# Patient Record
Sex: MALE | Race: WHITE | URBAN - METROPOLITAN AREA
[De-identification: names, ages, dates, MRNs, and addresses within clinical notes are randomized per-mention and may not be internally consistent; named-entity substitution may affect disease eponyms.]

---

## 2018-02-21 ENCOUNTER — OFFICE VISIT (OUTPATIENT)
Dept: DERMATOLOGY | Facility: CLINIC | Age: 53
End: 2018-02-21
Payer: COMMERCIAL

## 2018-02-21 DIAGNOSIS — I78.1 TELANGIECTASIA: Primary | ICD-10-CM

## 2018-02-21 DIAGNOSIS — L24.5 IRRITANT CONTACT DERMATITIS DUE TO OTHER CHEMICAL PRODUCTS: ICD-10-CM

## 2018-02-21 DIAGNOSIS — D22.9 MULTIPLE BENIGN NEVI: ICD-10-CM

## 2018-02-21 RX ORDER — ATORVASTATIN CALCIUM 80 MG/1
1 TABLET, FILM COATED ORAL
COMMUNITY
Start: 2017-05-22

## 2018-02-21 RX ORDER — METOPROLOL SUCCINATE 50 MG/1
TABLET, EXTENDED RELEASE ORAL
COMMUNITY
Start: 2017-12-07

## 2018-02-21 RX ORDER — DIPHENOXYLATE HYDROCHLORIDE AND ATROPINE SULFATE 2.5; .025 MG/1; MG/1
1 TABLET ORAL
COMMUNITY
Start: 2014-03-21

## 2018-02-21 RX ORDER — ASPIRIN 81 MG/1
1 TABLET ORAL
COMMUNITY
Start: 2014-02-21

## 2018-02-21 RX ORDER — TRIAMCINOLONE ACETONIDE 1 MG/G
CREAM TOPICAL
COMMUNITY
Start: 2017-10-03

## 2018-02-21 ASSESSMENT — PAIN SCALES - GENERAL: PAINLEVEL: NO PAIN (0)

## 2018-02-21 NOTE — MR AVS SNAPSHOT
After Visit Summary   2/21/2018    Black Patel    MRN: [id number]           Patient Information     Date Of Birth          1965        Visit Information        Provider Department      2/21/2018 4:00 PM JAY Carrillo MD [facility] Dermatology        Today's Diagnoses     Telangiectasia    -  1    Multiple benign nevi        Irritant contact dermatitis due to other chemical products           Follow-ups after your visit        Who to contact     Please call your clinic at [phone] to:    Ask questions about your health    Make or cancel appointments    Discuss your medicines    Learn about your test results    Speak to your doctor            Additional Information About Your Visit        MyChart Information     [employer] gives you secure access to your electronic health record. If you see a primary care provider, you can also send messages to your care team and make appointments. If you have questions, please call your primary care clinic.  If you do not have a primary care provider, please call [phone] and they will assist you.      [employer] is an electronic gateway that provides easy, online access to your medical records. With [employer], you can request a clinic appointment, read your test results, renew a prescription or communicate with your care team.     To access your existing account, please contact your [facility] or call [phone] for assistance.        Care EveryWhere ID     This is your Care EveryWhere ID. This could be used by other organizations to access your [facility] medical records  [id number]         Blood Pressure from Last 3 Encounters:   No data found for BP    Weight from Last 3 Encounters:   No data found for Wt              Today, you had the following     No orders found for display         Today's Medication Changes          These changes are accurate as of 2/21/18 11:59 PM.  If you have any questions, ask your nurse or  doctor.               These medicines have changed or have updated prescriptions.        Dose/Directions    aspirin EC 81 MG EC tablet   This may have changed:  Another medication with the same name was removed. Continue taking this medication, and follow the directions you see here.   Changed by:  JAY Carrillo MD        Dose:  1 tablet   Take 1 tablet by mouth   Refills:  0       atorvastatin 80 MG tablet   Commonly known as:  LIPITOR   This may have changed:  Another medication with the same name was removed. Continue taking this medication, and follow the directions you see here.   Changed by:  JAY Carrillo MD        Dose:  1 tablet   Take 1 tablet by mouth   Refills:  0                Primary Care Provider Office Phone # Fax #    Tunde Rushing [phone]       [facility] [address] [address]        Equal Access to Services     [facility]: Hadii maeve ku hadasho Soomaali, waaxda luqadaha, qaybta kaalmada adeegyada, waxay nissain hayalphonse hogue . So [facility] [phone].    ATENCIÓN: Si habla español, tiene a simmons disposición servicios gratuitos de asistencia lingüística. Shanika al [phone].    We comply with applicable federal civil rights laws and [place] laws. We do not discriminate on the basis of race, color, national origin, age, disability, sex, sexual orientation, or gender identity.            Thank you!     Thank you for choosing [facility] DERMATOLOGY  for your care. Our goal is always to provide you with excellent care. Hearing back from our patients is one way we can continue to improve our services. Please take a few minutes to complete the written survey that you may receive in the mail after your visit with us. Thank you!             Your Updated Medication List - Protect others around you: Learn how to safely use, store and throw away your medicines at www.disposemymeds.org.          This list is accurate as of 2/21/18 11:59  PM.  Always use your most recent med list.                   Brand Name Dispense Instructions for use Diagnosis    aluminum chloride 20 % external solution    DRYSOL    60 mL    To improve effect, cover area of application with plastic wrap,  hold in place with tight shirt, and wash area in morning. As sweating improves, decrease use to 1-2 times weekly.    Hyperhydrosis disorder       aspirin EC 81 MG EC tablet      Take 1 tablet by mouth        atorvastatin 80 MG tablet    LIPITOR     Take 1 tablet by mouth        hydrochlorothiazide 25 MG tablet    HYDRODIURIL    60 tablet    Take 1 tablet (25 mg) by mouth daily    Hypertension goal BP (blood pressure) < 140/90       metoprolol succinate 50 MG 24 hr tablet    TOPROL-XL     Take one tablet daily. Please schedule an annual visit with Cardiology or defer refill to PCP        metoprolol tartrate 50 MG tablet    LOPRESSOR    180 tablet    Take 1 tablet by mouth 2 times daily.    ASCVD (arteriosclerotic cardiovascular disease)       METROGEL 1 % gel   Generic drug:  metroNIDAZOLE      Apply 0.5 inches topically. Apply prn    Hyperlipidemia LDL goal <70       MULTI-VITAMINS Tabs      Take 1 tablet by mouth        nitroGLYcerin 0.4 MG sublingual tablet    NITROSTAT    30 tablet    Place 1 tablet under the tongue every 5 minutes as needed for chest pain.    S/P coronary artery stent placement       triamcinolone 0.1 % cream    KENALOG     APPLY TO AFFECTED AREA(S) ONCE DAILY FOR 14 DAYS DO NOT USE ON FACE        valACYclovir 500 MG tablet    VALTREX    15 tablet    Take 1 tablet (500 mg) by mouth daily    Recurrent genital herpes

## 2018-02-21 NOTE — LETTER
2/21/2018       RE: Black Patel  [address] [place] [address]     Dear Colleague,    Thank you for referring your patient, Black Patel, to the [facility] DERMATOLOGY at [facility]. Please see a copy of my visit note below.     Dictation on: 02/21/2018  5:28 PM by: JAY ODOM [[id number]]         Again, thank you for allowing me to participate in the care of your patient.      Sincerely,    JAY Odom MD

## 2018-02-21 NOTE — NURSING NOTE
Dermatology Rooming Note    Black Patel's goals for this visit include:   Chief Complaint   Patient presents with     Rash     Black is here today for a rash on his neck and face.      SHIRLEY Whiting

## 2018-02-21 NOTE — PROGRESS NOTES
SUBJECTIVE:  Black comes in for the first time to see us at the recommendation of an allergist, Ally Friedman PA-C, at [facility].  Black states that a few months ago, he developed an acute rash on his chest, and he had a photo of it on his phone.  It looked a very acute, inflammatory process that came and disappeared fairly quickly.  He was given some topical steroids and  some prednisone and that seemed to take care of the chest lesions.      His other problem has been that of chronic redness of the face which he feels is combined with some symptoms of fatigue and  muscle aching prompted him to see his doctor, who has been checking recently for autoimmune diseases.  He brought in his labs that showed his BONY was negative and almost all tests  other than triglyceride were normal.      He states that his face has been red for many years and he has been told that this is inflammation.  He, however, was given some prednisone to try for the facial redness and this was dramatically helpful but then he experienced a rebound within a few weeks.  Right now he states that he feels very fatigued and has been like that for quite a few months.  He is a fitness person, but has difficulty running and keeping fit.      OBJECTIVE:  Shows a  pleasant, healthy gentleman in no distress.  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  He denies having any pimples on his face that to me rules out rosacea as the cause of this.  I do not believe in  erythrotelangiectatic rosacea when there is no evidence of focal inflammation (as manifest by papules of acne).      The chest rash has resolved.  His skin is dry.  He does have a few patches of eczema here and there, but nothing severe,  The issue currently is why does he have a red face  and is there any connection between his red face and his symptoms.      On review of his medication, I note that he is on 80 mg  of Lipitor daily and to me this is enough to cause significant muscle problems, both pain and weakness.  He has been aware of this possibility and has been taking CoQ10 but that has not helped.    I did not do a complete examination, but I do note that he is blond, blue eyed and has this diffuse telangiectasia of his face and neck that often accompanies this skin type.      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   He does have triamcinolone cream for some itching that accompanies some of his rashes.  I recommend the followin.  I did not think patch testing was appropriate unless he develops more of the chest rash which could indicates an acute contact dermatitis.   2.  I recommended that he contact his physician for considering changing his statin or lowering the dose and addressing his tiredness and muscle weakness and discomfort.  as being a statin side effect. I also suggested he consider taking vitamin D which several studies show reduce  the muscle side effects of statins.   3.  I advised that while there were some new  topical agents to could vasoconstrict his facial vessels, in my opinion, there are fairly worthless in that they work for only a few hours  and are quite  expensive.   4.  I suggested that if he develops another chest rash as he did, that he contact us and we will  try to see him as quickly as possible and put together a plan for  patch testing. Also he should  take photos.      We had a lengthy visit mainly discussing his side effects and the fact that there is very little one can do for her essential telangiectasia, other than laser.  He was going to see someone who had a promising correction of this, but it sounded to me  Unscientific and likely would not help  permanently which is his desire.      MEDICATIONS AND ALLERGIES:  Reviewed as mentioned.      He has seen Dr. Kraus in Cardiology, so does have a cardiac plan in place.      Return on a p.r.n. basis.     CC:  Ally Friedman P-DAXA   [facility]

## 2018-02-21 NOTE — LETTER
2/21/2018      RE: Black Patel  [address] [place] [address]       SUBJECTIVE:  Black comes in for the first time to see us at the recommendation of an allergist, Ally Friedman PA-C, at [facility].  Black states that a few months ago, he developed an acute rash on his chest, and he had a photo of it on his phone.  It looked a very acute, inflammatory process that came and disappeared fairly quickly.  He was given some topical steroids and  some prednisone and that seemed to take care of the chest lesions.      His other problem has been that of chronic redness of the face which he feels is combined with some symptoms of fatigue and  muscle aching prompted him to see his doctor, who has been checking recently for autoimmune diseases.  He brought in his labs that showed his BONY was negative and almost all tests  other than triglyceride were normal.      He states that his face has been red for many years and he has been told that this is inflammation.  He, however, was given some prednisone to try for the facial redness and this was dramatically helpful but then he experienced a rebound within a few weeks.  Right now he states that he feels very fatigued and has been like that for quite a few months.  He is a fitness person, but has difficulty running and keeping fit.      OBJECTIVE:  Shows a  pleasant, healthy gentleman in no distress.  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  He denies having any pimples on his face that to me rules out rosacea as the cause of this.  I do not believe in  erythrotelangiectatic rosacea when there is no evidence of focal inflammation (as manifest by papules of acne).      The chest rash has resolved.  His skin is dry.  He does have a few patches of eczema here and there, but nothing severe,  The issue currently is why does he have a red face  and is there any connection between his red  face and his symptoms.      On review of his medication, I note that he is on 80 mg of Lipitor daily and to me this is enough to cause significant muscle problems, both pain and weakness.  He has been aware of this possibility and has been taking CoQ10 but that has not helped.    I did not do a complete examination, but I do note that he is blond, blue eyed and has this diffuse telangiectasia of his face and neck that often accompanies this skin type.      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   He does have triamcinolone cream for some itching that accompanies some of his rashes.  I recommend the followin.  I did not think patch testing was appropriate unless he develops more of the chest rash which could indicates an acute contact dermatitis.   2.  I recommended that he contact his physician for considering changing his statin or lowering the dose and addressing his tiredness and muscle weakness and discomfort.  as being a statin side effect. I also suggested he consider taking vitamin D which several studies show reduce  the muscle side effects of statins.   3.  I advised that while there were some new  topical agents to could vasoconstrict his facial vessels, in my opinion, there are fairly worthless in that they work for only a few hours  and are quite  expensive.   4.  I suggested that if he develops another chest rash as he did, that he contact us and we will  try to see him as quickly as possible and put together a plan for  patch testing. Also he should  take photos.      We had a lengthy visit mainly discussing his side effects and the fact that there is very little one can do for her essential telangiectasia, other than laser.  He was going to see someone who had a promising  correction of this, but it sounded to me  Unscientific and likely would not help permanently which is his desire.      MEDICATIONS AND ALLERGIES:  Reviewed as mentioned.      He has seen Dr. Kraus in Cardiology, so does have a cardiac plan in place.      Return on a p.r.n. basis.     H Luis M Carrillo MD    CC:  Ally Friedman, P-AC   [facility]

## 2019-09-30 ENCOUNTER — HEALTH MAINTENANCE LETTER (OUTPATIENT)
Age: 54
End: 2019-09-30

## 2019-10-29 ENCOUNTER — HEALTH MAINTENANCE LETTER (OUTPATIENT)
Age: 54
End: 2019-10-29

## 2021-01-15 ENCOUNTER — HEALTH MAINTENANCE LETTER (OUTPATIENT)
Age: 56
End: 2021-01-15

## 2021-10-24 ENCOUNTER — HEALTH MAINTENANCE LETTER (OUTPATIENT)
Age: 56
End: 2021-10-24

## 2022-02-13 ENCOUNTER — HEALTH MAINTENANCE LETTER (OUTPATIENT)
Age: 57
End: 2022-02-13

## 2022-10-16 ENCOUNTER — HEALTH MAINTENANCE LETTER (OUTPATIENT)
Age: 57
End: 2022-10-16

## 2023-03-26 ENCOUNTER — HEALTH MAINTENANCE LETTER (OUTPATIENT)
Age: 58
End: 2023-03-26